# Patient Record
Sex: FEMALE | URBAN - METROPOLITAN AREA
[De-identification: names, ages, dates, MRNs, and addresses within clinical notes are randomized per-mention and may not be internally consistent; named-entity substitution may affect disease eponyms.]

---

## 2022-11-04 ENCOUNTER — APPOINTMENT (RX ONLY)
Dept: URBAN - METROPOLITAN AREA CLINIC 55 | Facility: CLINIC | Age: 57
Setting detail: DERMATOLOGY
End: 2022-11-04

## 2022-11-04 DIAGNOSIS — Z41.9 ENCOUNTER FOR PROCEDURE FOR PURPOSES OTHER THAN REMEDYING HEALTH STATE, UNSPECIFIED: ICD-10-CM

## 2022-11-04 PROCEDURE — ? COSMETIC CONSULTATION: GENERAL

## 2022-11-04 PROCEDURE — ? COSMETIC CONSULTATION: BOTULINUM TOXIN

## 2022-11-04 PROCEDURE — ? INVENTORY

## 2022-11-04 PROCEDURE — ? BOTOX

## 2022-11-04 PROCEDURE — ? COSMETIC CONSULTATION: FILLERS

## 2022-11-04 NOTE — PROCEDURE: BOTOX
Additional Area 1 Units: 0
Show Additional Area 2: Yes
Depressor Anguli Oris Units: 6
Show Inventory Tab: Show
Show Ucl Units: No
Consent obtained and risk reviewed.
Post-Care Instructions: Discussed not to lie down flat for 4 hours or rub the treatment area.
Periorbital Skin Units: 8
Dilution (U/0.1 Cc): 4
Comments: Make up removed from treatment area and cleansed with a alcohol pad.
Additional Area 1 Location: upper lip
Detail Level: Detailed
Glabellar Complex Units: 16